# Patient Record
Sex: FEMALE
[De-identification: names, ages, dates, MRNs, and addresses within clinical notes are randomized per-mention and may not be internally consistent; named-entity substitution may affect disease eponyms.]

---

## 2018-09-30 ENCOUNTER — TRANSCRIPTION ENCOUNTER (OUTPATIENT)
Age: 28
End: 2018-09-30

## 2019-02-25 ENCOUNTER — TRANSCRIPTION ENCOUNTER (OUTPATIENT)
Age: 29
End: 2019-02-25

## 2020-01-05 ENCOUNTER — TRANSCRIPTION ENCOUNTER (OUTPATIENT)
Age: 30
End: 2020-01-05

## 2021-09-27 PROBLEM — Z00.00 ENCOUNTER FOR PREVENTIVE HEALTH EXAMINATION: Status: ACTIVE | Noted: 2021-09-27

## 2021-10-14 ENCOUNTER — APPOINTMENT (OUTPATIENT)
Dept: OBGYN | Facility: CLINIC | Age: 31
End: 2021-10-14
Payer: COMMERCIAL

## 2021-10-14 VITALS
HEIGHT: 68 IN | TEMPERATURE: 98 F | DIASTOLIC BLOOD PRESSURE: 64 MMHG | WEIGHT: 173 LBS | SYSTOLIC BLOOD PRESSURE: 118 MMHG | BODY MASS INDEX: 26.22 KG/M2

## 2021-10-14 DIAGNOSIS — Z97.5 PRESENCE OF (INTRAUTERINE) CONTRACEPTIVE DEVICE: ICD-10-CM

## 2021-10-14 DIAGNOSIS — Z80.3 FAMILY HISTORY OF MALIGNANT NEOPLASM OF BREAST: ICD-10-CM

## 2021-10-14 PROCEDURE — 99385 PREV VISIT NEW AGE 18-39: CPT

## 2021-10-14 NOTE — DISCUSSION/SUMMARY
[FreeTextEntry1] : 1) pap performed\par 2) fissure noted to right nipple. Rx for Nystatin/Triamcinolone issued\par \par Return to office in one year, sooner prn

## 2021-10-14 NOTE — HISTORY OF PRESENT ILLNESS
[Regular Cycle Intervals] : periods have been regular [Currently Active] : currently active [Men] : men [TextBox_4] : Monica is a 31 y/0 G0 who presents today for an annual exam with c/o right breast nipple tenderness for the past 2 weeks. She states that she has been applying aquaphor with out relief.\par \par She has had the Kyleena IUD since 2/2020 and has light monthly menses [FreeTextEntry1] : 10/1/21 [FreeTextEntry3] : IUD

## 2021-10-14 NOTE — PHYSICAL EXAM
[Appropriately responsive] : appropriately responsive [Alert] : alert [No Acute Distress] : no acute distress [No Lymphadenopathy] : no lymphadenopathy [Oriented x3] : oriented x3 [Examination Of The Breasts] : a normal appearance [Breast Nipple Fissures Right] : fissured [No Masses] : no breast masses were palpable [Labia Majora] : normal [Labia Minora] : normal [Discharge] : discharge [Moderate] : moderate [Thin] : thin [IUD String] : an IUD string was noted [Normal] : normal [Uterine Adnexae] : normal

## 2021-10-15 ENCOUNTER — TRANSCRIPTION ENCOUNTER (OUTPATIENT)
Age: 31
End: 2021-10-15

## 2021-10-16 LAB — HPV HIGH+LOW RISK DNA PNL CVX: NOT DETECTED

## 2021-12-27 ENCOUNTER — TRANSCRIPTION ENCOUNTER (OUTPATIENT)
Age: 31
End: 2021-12-27

## 2022-09-28 ENCOUNTER — APPOINTMENT (OUTPATIENT)
Dept: OBGYN | Facility: CLINIC | Age: 32
End: 2022-09-28

## 2022-09-28 VITALS
TEMPERATURE: 98.7 F | SYSTOLIC BLOOD PRESSURE: 120 MMHG | HEIGHT: 68 IN | DIASTOLIC BLOOD PRESSURE: 80 MMHG | WEIGHT: 190 LBS | BODY MASS INDEX: 28.79 KG/M2

## 2022-09-28 DIAGNOSIS — Z01.411 ENCOUNTER FOR GYNECOLOGICAL EXAMINATION (GENERAL) (ROUTINE) WITH ABNORMAL FINDINGS: ICD-10-CM

## 2022-09-28 DIAGNOSIS — Z11.3 ENCOUNTER FOR SCREENING FOR INFECTIONS WITH A PREDOMINANTLY SEXUAL MODE OF TRANSMISSION: ICD-10-CM

## 2022-09-28 DIAGNOSIS — Z78.9 OTHER SPECIFIED HEALTH STATUS: ICD-10-CM

## 2022-09-28 DIAGNOSIS — Z82.49 FAMILY HISTORY OF ISCHEMIC HEART DISEASE AND OTHER DISEASES OF THE CIRCULATORY SYSTEM: ICD-10-CM

## 2022-09-28 DIAGNOSIS — N64.4 MASTODYNIA: ICD-10-CM

## 2022-09-28 DIAGNOSIS — Z87.891 PERSONAL HISTORY OF NICOTINE DEPENDENCE: ICD-10-CM

## 2022-09-28 LAB
BILIRUB UR QL STRIP: NORMAL
CLARITY UR: CLEAR
COLLECTION METHOD: NORMAL
GLUCOSE UR-MCNC: NORMAL
HCG UR QL: 0.2 EU/DL
HGB UR QL STRIP.AUTO: NORMAL
KETONES UR-MCNC: NORMAL
LEUKOCYTE ESTERASE UR QL STRIP: NORMAL
NITRITE UR QL STRIP: NORMAL
PH UR STRIP: 6.5
PROT UR STRIP-MCNC: NORMAL
SP GR UR STRIP: 1.03

## 2022-09-28 PROCEDURE — 81003 URINALYSIS AUTO W/O SCOPE: CPT | Mod: QW

## 2022-09-28 PROCEDURE — 99395 PREV VISIT EST AGE 18-39: CPT

## 2022-09-28 RX ORDER — NYSTATIN AND TRIAMCINOLONE ACETONIDE 100000; 1 [USP'U]/G; MG/G
100000-0.1 OINTMENT TOPICAL 3 TIMES DAILY
Qty: 1 | Refills: 0 | Status: DISCONTINUED | COMMUNITY
Start: 2021-10-14 | End: 2022-09-28

## 2022-09-28 RX ORDER — BUPROPION HYDROCHLORIDE 150 MG/1
150 TABLET, FILM COATED ORAL
Refills: 0 | Status: ACTIVE | COMMUNITY

## 2022-09-28 NOTE — HISTORY OF PRESENT ILLNESS
[Currently Active] : currently active [Men] : men [TextBox_4] : Monica is a 32 y/o g0 who presents today for an annual exam.\par \par She has c/o left breast pain that occurs only when she lies on her back to sleep at night. She also has c/o pain in her urethra and pelvic pressure. Urine dip is normal today\par \par She has had the Kyleena IUD since 2/2020 and continues to have light monthly menses. [FreeTextEntry1] : 9/1/22 [FreeTextEntry3] : Kyleena

## 2022-09-28 NOTE — DISCUSSION/SUMMARY
[FreeTextEntry1] : 1) pap performed\par 2) rx for left breast sono provided for further evaluation\par 3) urine dip wnl, will see if pap notes any abnormal findings. vaginal exam wnl\par \par Return to office in one year, sooner prm

## 2022-09-28 NOTE — PHYSICAL EXAM
[Appropriately responsive] : appropriately responsive [Alert] : alert [No Acute Distress] : no acute distress [No Lymphadenopathy] : no lymphadenopathy [Oriented x3] : oriented x3 [Examination Of The Breasts] : a normal appearance [Tenderness Of The Left Breast] : tenderness [No Masses] : no breast masses were palpable [Labia Majora] : normal [Labia Minora] : normal [IUD String] : an IUD string was noted [Normal] : normal [Uterine Adnexae] : normal

## 2022-09-29 ENCOUNTER — RESULT REVIEW (OUTPATIENT)
Age: 32
End: 2022-09-29

## 2022-09-29 LAB
C TRACH RRNA SPEC QL NAA+PROBE: NOT DETECTED
N GONORRHOEA RRNA SPEC QL NAA+PROBE: NOT DETECTED
SOURCE TP AMPLIFICATION: NORMAL

## 2022-09-30 LAB — CYTOLOGY CVX/VAG DOC THIN PREP: NORMAL

## 2022-10-07 ENCOUNTER — APPOINTMENT (OUTPATIENT)
Dept: ULTRASOUND IMAGING | Facility: CLINIC | Age: 32
End: 2022-10-07

## 2022-10-07 ENCOUNTER — OUTPATIENT (OUTPATIENT)
Dept: OUTPATIENT SERVICES | Facility: HOSPITAL | Age: 32
LOS: 1 days | End: 2022-10-07
Payer: COMMERCIAL

## 2022-10-07 ENCOUNTER — RESULT REVIEW (OUTPATIENT)
Age: 32
End: 2022-10-07

## 2022-10-07 DIAGNOSIS — N64.4 MASTODYNIA: ICD-10-CM

## 2022-10-07 PROCEDURE — 76642 ULTRASOUND BREAST LIMITED: CPT | Mod: 26,LT

## 2022-10-07 PROCEDURE — 76642 ULTRASOUND BREAST LIMITED: CPT

## 2022-10-11 ENCOUNTER — NON-APPOINTMENT (OUTPATIENT)
Age: 32
End: 2022-10-11

## 2023-06-14 ENCOUNTER — APPOINTMENT (OUTPATIENT)
Dept: OBGYN | Facility: CLINIC | Age: 33
End: 2023-06-14

## 2024-01-03 ENCOUNTER — APPOINTMENT (OUTPATIENT)
Dept: OBGYN | Facility: CLINIC | Age: 34
End: 2024-01-03
Payer: COMMERCIAL

## 2024-01-03 VITALS
WEIGHT: 207 LBS | DIASTOLIC BLOOD PRESSURE: 74 MMHG | BODY MASS INDEX: 31.37 KG/M2 | HEIGHT: 68 IN | SYSTOLIC BLOOD PRESSURE: 120 MMHG

## 2024-01-03 DIAGNOSIS — N64.4 MASTODYNIA: ICD-10-CM

## 2024-01-03 DIAGNOSIS — Z01.419 ENCOUNTER FOR GYNECOLOGICAL EXAMINATION (GENERAL) (ROUTINE) W/OUT ABNORMAL FINDINGS: ICD-10-CM

## 2024-01-03 DIAGNOSIS — Z12.4 ENCOUNTER FOR SCREENING FOR MALIGNANT NEOPLASM OF CERVIX: ICD-10-CM

## 2024-01-03 PROCEDURE — 99395 PREV VISIT EST AGE 18-39: CPT

## 2024-01-03 RX ORDER — ESCITALOPRAM OXALATE 10 MG/1
10 TABLET, FILM COATED ORAL
Refills: 0 | Status: DISCONTINUED | COMMUNITY
End: 2024-01-03

## 2024-01-03 NOTE — HISTORY OF PRESENT ILLNESS
[Currently Active] : currently active [Men] : men [TextBox_4] : Monica is a 34 y/o G0 who presents today for an annual exam.  She has c/o weight gain since being on Lexapro which she stopped 11/2023. She also has had the Kyleena IUD and is wondering if it could be contributing to weight gain. She has made diet modifications and is exercising regularly. [FreeTextEntry1] : 12/25/23 [FreeTextEntry3] : Kyleena 2/2020

## 2024-01-03 NOTE — DISCUSSION/SUMMARY
[FreeTextEntry1] : 1) pap performed 2) discussed exercise and dietary modifications.  Also discussed that any hormonal contraception has the potential for weight gain.  Pt to consider IUD removal

## 2024-01-05 LAB
CYTOLOGY CVX/VAG DOC THIN PREP: NORMAL
HPV HIGH+LOW RISK DNA PNL CVX: NOT DETECTED

## 2025-01-08 ENCOUNTER — APPOINTMENT (OUTPATIENT)
Dept: OBGYN | Facility: CLINIC | Age: 35
End: 2025-01-08